# Patient Record
Sex: FEMALE | Race: BLACK OR AFRICAN AMERICAN | NOT HISPANIC OR LATINO
[De-identification: names, ages, dates, MRNs, and addresses within clinical notes are randomized per-mention and may not be internally consistent; named-entity substitution may affect disease eponyms.]

---

## 2017-03-09 PROBLEM — Z00.00 ENCOUNTER FOR PREVENTIVE HEALTH EXAMINATION: Status: ACTIVE | Noted: 2017-03-09

## 2022-07-26 ENCOUNTER — APPOINTMENT (OUTPATIENT)
Dept: ORTHOPEDIC SURGERY | Facility: CLINIC | Age: 52
End: 2022-07-26

## 2022-07-26 VITALS — HEIGHT: 65 IN | WEIGHT: 190 LBS | BODY MASS INDEX: 31.65 KG/M2

## 2022-07-26 DIAGNOSIS — S83.91XD SPRAIN OF UNSPECIFIED SITE OF RIGHT KNEE, SUBSEQUENT ENCOUNTER: ICD-10-CM

## 2022-07-26 DIAGNOSIS — Z78.9 OTHER SPECIFIED HEALTH STATUS: ICD-10-CM

## 2022-07-26 PROCEDURE — 20611 DRAIN/INJ JOINT/BURSA W/US: CPT | Mod: RT

## 2022-07-26 PROCEDURE — 99204 OFFICE O/P NEW MOD 45 MIN: CPT | Mod: 25

## 2022-07-26 PROCEDURE — 76881 US COMPL JOINT R-T W/IMG: CPT | Mod: RT

## 2022-07-26 PROCEDURE — J3490M: CUSTOM

## 2022-07-26 PROCEDURE — 73030 X-RAY EXAM OF SHOULDER: CPT | Mod: RT

## 2022-07-26 NOTE — HISTORY OF PRESENT ILLNESS
[Right Arm] : right arm [Sudden] : sudden [6] : 6 [1] : 2 [Burning] : burning [Radiating] : radiating [Throbbing] : throbbing [Intermittent] : intermittent [Rest] : rest [de-identified] : RHD, she has this for 2 months happened after a fall twice on outstretched arm on cement, no treatment, tried oTCs with some help [] : Post Surgical Visit: no [FreeTextEntry1] : right shoulder [FreeTextEntry3] : May & July 2022 [FreeTextEntry5] : Patient fell and tried to break her fall [FreeTextEntry7] : humerus & neck [de-identified] : activity

## 2022-07-26 NOTE — DISCUSSION/SUMMARY
[de-identified] : Patient allowed to gently start resuming activities. \par Discussed change to medication prescription and usage. \par Offered cortisone steroid injection. \par \par  \par \par RE:  BELLE SIMMONS \par \par Acct #- [id number] \par \par \par \par Attention:  Nurse Reviewer /Medical Director\par \par  \par Based on my patient's condition, I strongly believe that the MRI R shoulder is medically.necessary.  \par The patient has failed oral meds, injections  and conservative treatment and had injury in combination or by themselves and therefore needs the MRI.  \par The MRI will dictate further treatment t recommendations.\par \par

## 2022-07-26 NOTE — PROCEDURE
[Large Joint Injection] : Large joint injection [Right] : of the right [Call if redness, pain or fever occur] : call if redness, pain or fever occur [Apply ice for 15min out of every hour for the next 12-24 hours as tolerated] : apply ice for 15 minutes out of every hour for the next 12-24 hours as tolerated [Patient was advised to rest the joint(s) for ____ days] : patient was advised to rest the joint(s) for [unfilled] days [All ultrasound images have been permanently captured and stored accordingly in our picture archiving and communication system] : All ultrasound images have been permanently captured and stored accordingly in our picture archiving and communication system [Pain] : pain [Inflammation] : inflammation

## 2022-08-02 ENCOUNTER — FORM ENCOUNTER (OUTPATIENT)
Age: 52
End: 2022-08-02

## 2022-08-02 ENCOUNTER — APPOINTMENT (OUTPATIENT)
Dept: MRI IMAGING | Facility: CLINIC | Age: 52
End: 2022-08-02

## 2022-08-03 ENCOUNTER — APPOINTMENT (OUTPATIENT)
Dept: MRI IMAGING | Facility: CLINIC | Age: 52
End: 2022-08-03

## 2022-08-03 PROCEDURE — 73221 MRI JOINT UPR EXTREM W/O DYE: CPT | Mod: RT

## 2022-08-13 ENCOUNTER — NON-APPOINTMENT (OUTPATIENT)
Age: 52
End: 2022-08-13

## 2022-08-22 ENCOUNTER — APPOINTMENT (OUTPATIENT)
Dept: ORTHOPEDIC SURGERY | Facility: CLINIC | Age: 52
End: 2022-08-22

## 2022-08-22 VITALS — WEIGHT: 190 LBS | BODY MASS INDEX: 31.65 KG/M2 | HEIGHT: 65 IN

## 2022-08-22 DIAGNOSIS — S43.491A OTHER SPRAIN OF RIGHT SHOULDER JOINT, INITIAL ENCOUNTER: ICD-10-CM

## 2022-08-22 PROCEDURE — 99214 OFFICE O/P EST MOD 30 MIN: CPT

## 2022-08-22 NOTE — DATA REVIEWED
[MRI] : MRI [Right] : of the right [Shoulder] : shoulder [Report was reviewed and noted in the chart] : The report was reviewed and noted in the chart [FreeTextEntry1] : 1. Moderate grade partial tearing involving the distal 2 cm of the supraspinatus tendon insertion with \par delamination and moderate surrounding bursitis without retraction or atrophy.\par 2. 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 Mild glenohumeral joint capsulitis, infraspinatus and subscapularis tendinopathy, and mild biceps \par tenosynovitis.\par 4. Findings consistent with a recent posterior therapeutic injection which should be correlated clinically.

## 2022-08-22 NOTE — PHYSICAL EXAM
[Right] : right shoulder [There are no fractures, subluxations or dislocations. No significant abnormalities are seen] : There are no fractures, subluxations or dislocations. No significant abnormalities are seen [] : no erythema

## 2022-08-22 NOTE — HISTORY OF PRESENT ILLNESS
[Right Arm] : right arm [Sudden] : sudden [1] : 2 [Burning] : burning [Radiating] : radiating [Throbbing] : throbbing [Intermittent] : intermittent [Rest] : rest [de-identified] : RHD, she has this almost 3 months happened after a fall twice on outstretched arm on cement, CSI helped and had mRI [6] : 6 [] : Post Surgical Visit: no [FreeTextEntry1] : right shoulder [FreeTextEntry3] : May & July 2022 [FreeTextEntry5] : Patient fell and tried to break her fall [FreeTextEntry7] : humerus & neck [de-identified] : activity

## 2022-08-22 NOTE — DISCUSSION/SUMMARY
[de-identified] : Patient allowed to gently start resuming activities. \par Discussed change to medication prescription and usage. \par Activity modification as needed\par Name of Insurance\par Insurance Address:\par \par 08/22/2022 \par \par  \par \par RE:  BELLE SIMMONS \par \par Acct #- [id number] \par \par \par Attention:  Nurse Reviewer /Medical Director\par \par I am writing this letter as a medical necessity for PT program.\par Patient has tried analgesics, non-steroid anti-inflammatory agents, \par hot or cold compresses,injections of corticosteroids, etc)  which in combination or by themselves has not worked.\par Based on my patient's condition, I strongly believe that the PT is medically needed.\par  \par Thank you for your time and consideration.   \par  \par \par \par \par

## 2022-11-14 ENCOUNTER — APPOINTMENT (OUTPATIENT)
Dept: ORTHOPEDIC SURGERY | Facility: CLINIC | Age: 52
End: 2022-11-14

## 2022-11-14 VITALS — WEIGHT: 190 LBS | BODY MASS INDEX: 31.65 KG/M2 | HEIGHT: 65 IN

## 2022-11-14 DIAGNOSIS — M77.8 OTHER ENTHESOPATHIES, NOT ELSEWHERE CLASSIFIED: ICD-10-CM

## 2022-11-14 PROCEDURE — 99214 OFFICE O/P EST MOD 30 MIN: CPT

## 2022-11-14 RX ORDER — DICLOFENAC SODIUM AND MISOPROSTOL 75; 200 MG/1; UG/1
75-0.2 TABLET, DELAYED RELEASE ORAL
Qty: 60 | Refills: 0 | Status: ACTIVE | COMMUNITY
Start: 2022-11-14 | End: 1900-01-01

## 2022-11-14 NOTE — DISCUSSION/SUMMARY
[de-identified] : continue PT Program.\par Poss of repeating CSI discussed\par \par \par 11/14/2022 \par \par  RE:  BELLE SIMMONS \par \par Acct #- [id number] \par \par \par Attention:  Nurse Reviewer /Medical Director\par \par I am writing this letter as a medical necessity for PT program.\par Patient has tried analgesics, non-steroid anti-inflammatory agents, \par hot or cold compresses,injections of corticosteroids, etc)  which in combination or by themselves has not worked.\par Based on my patient's condition, I strongly believe that the PT is medically needed.\par  \par Thank you for your time and consideration.   \par

## 2022-11-14 NOTE — HISTORY OF PRESENT ILLNESS
[Right Arm] : right arm [Sudden] : sudden [6] : 6 [1] : 2 [Burning] : burning [Radiating] : radiating [Throbbing] : throbbing [Intermittent] : intermittent [Rest] : rest [de-identified] : Patient has persistent symptoms in the right shoulder, she has seen improvement with. REaching ROM slowly improving.  [] : Post Surgical Visit: no [FreeTextEntry1] : right shoulder [FreeTextEntry3] : May & July 2022 [FreeTextEntry5] : Patient fell and tried to break her fall [FreeTextEntry7] : humerus & neck [de-identified] : activity

## 2024-01-18 ENCOUNTER — RX ONLY (RX ONLY)
Age: 54
End: 2024-01-18

## 2024-01-18 ENCOUNTER — OFFICE (OUTPATIENT)
Facility: LOCATION | Age: 54
Setting detail: OPHTHALMOLOGY
End: 2024-01-18
Payer: COMMERCIAL

## 2024-01-18 DIAGNOSIS — H16.223: ICD-10-CM

## 2024-01-18 DIAGNOSIS — B97.7: ICD-10-CM

## 2024-01-18 DIAGNOSIS — H50.111: ICD-10-CM

## 2024-01-18 PROCEDURE — 92002 INTRM OPH EXAM NEW PATIENT: CPT | Performed by: OPHTHALMOLOGY

## 2024-01-18 ASSESSMENT — SPHEQUIV_DERIVED
OS_SPHEQUIV: -3.875
OD_SPHEQUIV: -3.5

## 2024-01-18 ASSESSMENT — CONFRONTATIONAL VISUAL FIELD TEST (CVF)
OD_FINDINGS: FULL
OS_FINDINGS: FULL

## 2024-01-18 ASSESSMENT — REFRACTION_AUTOREFRACTION
OS_SPHERE: -3.50
OD_CYLINDER: -0.50
OS_CYLINDER: -0.75
OS_AXIS: 152
OD_SPHERE: -3.25
OD_AXIS: 014

## 2024-01-18 ASSESSMENT — SUPERFICIAL PUNCTATE KERATITIS (SPK)
OS_SPK: 2+
OD_SPK: 2+

## 2024-01-29 ENCOUNTER — OFFICE (OUTPATIENT)
Facility: LOCATION | Age: 54
Setting detail: OPHTHALMOLOGY
End: 2024-01-29
Payer: COMMERCIAL

## 2024-01-29 ENCOUNTER — RX ONLY (RX ONLY)
Age: 54
End: 2024-01-29

## 2024-01-29 DIAGNOSIS — H04.561: ICD-10-CM

## 2024-01-29 DIAGNOSIS — H02.821: ICD-10-CM

## 2024-01-29 PROBLEM — H50.111 EXOTROPIA, MONOCULAR, RIGHT EYE: Status: ACTIVE | Noted: 2024-01-18

## 2024-01-29 PROBLEM — H16.223 DRY EYE SYNDROME K SICCA; BOTH EYES: Status: ACTIVE | Noted: 2024-01-18

## 2024-01-29 PROCEDURE — 92285 EXTERNAL OCULAR PHOTOGRAPHY: CPT | Performed by: OPHTHALMOLOGY

## 2024-01-29 PROCEDURE — 68815 PROBE NASOLACRIMAL DUCT: CPT | Mod: RT | Performed by: OPHTHALMOLOGY

## 2024-01-29 PROCEDURE — 67840 REMOVE EYELID LESION: CPT | Mod: 50,E3 | Performed by: OPHTHALMOLOGY

## 2024-01-29 ASSESSMENT — CONFRONTATIONAL VISUAL FIELD TEST (CVF)
OD_FINDINGS: FULL
OS_FINDINGS: FULL

## 2024-01-29 ASSESSMENT — REFRACTION_AUTOREFRACTION
OD_CYLINDER: -0.50
OS_SPHERE: -3.50
OD_SPHERE: -3.25
OD_AXIS: 014
OS_AXIS: 152
OS_CYLINDER: -0.75

## 2024-01-29 ASSESSMENT — SUPERFICIAL PUNCTATE KERATITIS (SPK)
OS_SPK: 2+
OD_SPK: 2+

## 2024-01-29 ASSESSMENT — SPHEQUIV_DERIVED
OS_SPHEQUIV: -3.875
OD_SPHEQUIV: -3.5

## 2024-02-12 ENCOUNTER — OFFICE (OUTPATIENT)
Facility: LOCATION | Age: 54
Setting detail: OPHTHALMOLOGY
End: 2024-02-12
Payer: COMMERCIAL

## 2024-02-12 DIAGNOSIS — H04.561: ICD-10-CM

## 2024-02-12 DIAGNOSIS — H02.821: ICD-10-CM

## 2024-02-12 PROCEDURE — 99213 OFFICE O/P EST LOW 20 MIN: CPT | Performed by: OPHTHALMOLOGY

## 2024-02-12 ASSESSMENT — CONFRONTATIONAL VISUAL FIELD TEST (CVF)
OD_FINDINGS: FULL
OS_FINDINGS: FULL

## 2024-02-12 ASSESSMENT — SPHEQUIV_DERIVED
OS_SPHEQUIV: -3.875
OD_SPHEQUIV: -3.5

## 2024-02-12 ASSESSMENT — REFRACTION_AUTOREFRACTION
OS_CYLINDER: -0.75
OD_CYLINDER: -0.50
OD_SPHERE: -3.25
OS_SPHERE: -3.50
OD_AXIS: 014
OS_AXIS: 152

## 2024-02-12 ASSESSMENT — SUPERFICIAL PUNCTATE KERATITIS (SPK)
OD_SPK: 2+
OS_SPK: 2+

## 2025-01-27 ENCOUNTER — OFFICE (OUTPATIENT)
Facility: LOCATION | Age: 55
Setting detail: OPHTHALMOLOGY
End: 2025-01-27
Payer: COMMERCIAL

## 2025-01-27 DIAGNOSIS — H04.561: ICD-10-CM

## 2025-01-27 DIAGNOSIS — H02.821: ICD-10-CM

## 2025-01-27 PROCEDURE — 99213 OFFICE O/P EST LOW 20 MIN: CPT | Performed by: OPHTHALMOLOGY

## 2025-01-27 ASSESSMENT — VISUAL ACUITY
OS_BCVA: 20/20
OD_BCVA: 20/20

## 2025-01-27 ASSESSMENT — REFRACTION_AUTOREFRACTION
OD_SPHERE: -3.25
OD_CYLINDER: -0.50
OS_AXIS: 152
OS_CYLINDER: -0.75
OD_AXIS: 014
OS_SPHERE: -3.50

## 2025-01-27 ASSESSMENT — SUPERFICIAL PUNCTATE KERATITIS (SPK)
OD_SPK: 2+
OS_SPK: 2+

## 2025-02-17 ENCOUNTER — OFFICE (OUTPATIENT)
Facility: LOCATION | Age: 55
Setting detail: OPHTHALMOLOGY
End: 2025-02-17
Payer: COMMERCIAL

## 2025-02-17 DIAGNOSIS — H02.821: ICD-10-CM

## 2025-02-17 DIAGNOSIS — H04.221: ICD-10-CM

## 2025-02-17 PROCEDURE — 67840 REMOVE EYELID LESION: CPT | Mod: E3 | Performed by: OPHTHALMOLOGY

## 2025-02-17 PROCEDURE — 68440 SNIP INC LACRIMAL PUNCTUM: CPT | Mod: 51,E4 | Performed by: OPHTHALMOLOGY

## 2025-02-17 ASSESSMENT — REFRACTION_AUTOREFRACTION
OS_AXIS: 152
OS_CYLINDER: -0.75
OD_CYLINDER: -0.50
OD_AXIS: 014
OS_SPHERE: -3.50
OD_SPHERE: -3.25

## 2025-02-17 ASSESSMENT — VISUAL ACUITY
OS_BCVA: 20/30
OD_BCVA: 20/20

## 2025-02-17 ASSESSMENT — CONFRONTATIONAL VISUAL FIELD TEST (CVF)
OS_FINDINGS: FULL
OD_FINDINGS: FULL

## 2025-02-17 ASSESSMENT — SUPERFICIAL PUNCTATE KERATITIS (SPK)
OD_SPK: 2+
OS_SPK: 2+

## 2025-02-24 ENCOUNTER — OFFICE (OUTPATIENT)
Facility: LOCATION | Age: 55
Setting detail: OPHTHALMOLOGY
End: 2025-02-24
Payer: COMMERCIAL

## 2025-02-24 ENCOUNTER — RX ONLY (RX ONLY)
Age: 55
End: 2025-02-24

## 2025-02-24 DIAGNOSIS — H02.821: ICD-10-CM

## 2025-02-24 DIAGNOSIS — H04.561: ICD-10-CM

## 2025-02-24 PROCEDURE — 99024 POSTOP FOLLOW-UP VISIT: CPT | Performed by: OPHTHALMOLOGY

## 2025-02-24 ASSESSMENT — VISUAL ACUITY
OD_BCVA: 20/20
OS_BCVA: 20/25-2

## 2025-02-24 ASSESSMENT — REFRACTION_AUTOREFRACTION
OS_SPHERE: -3.50
OS_AXIS: 152
OD_AXIS: 014
OD_CYLINDER: -0.50
OD_SPHERE: -3.25
OS_CYLINDER: -0.75

## 2025-02-24 ASSESSMENT — CONFRONTATIONAL VISUAL FIELD TEST (CVF)
OD_FINDINGS: FULL
OS_FINDINGS: FULL

## 2025-02-24 ASSESSMENT — SUPERFICIAL PUNCTATE KERATITIS (SPK)
OD_SPK: 2+
OS_SPK: 2+

## 2025-07-10 ENCOUNTER — OFFICE (OUTPATIENT)
Facility: LOCATION | Age: 55
Setting detail: OPHTHALMOLOGY
End: 2025-07-10
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
## 2025-07-10 PROCEDURE — NO SHOW FE NO SHOW FEE: Performed by: OPHTHALMOLOGY
